# Patient Record
Sex: FEMALE | Race: OTHER | HISPANIC OR LATINO | URBAN - METROPOLITAN AREA
[De-identification: names, ages, dates, MRNs, and addresses within clinical notes are randomized per-mention and may not be internally consistent; named-entity substitution may affect disease eponyms.]

---

## 2017-06-24 PROBLEM — Z00.00 ENCOUNTER FOR PREVENTIVE HEALTH EXAMINATION: Status: ACTIVE | Noted: 2017-06-24

## 2021-04-08 ENCOUNTER — EMERGENCY (EMERGENCY)
Facility: HOSPITAL | Age: 46
LOS: 0 days | Discharge: HOME | End: 2021-04-08
Attending: EMERGENCY MEDICINE | Admitting: EMERGENCY MEDICINE
Payer: COMMERCIAL

## 2021-04-08 VITALS
RESPIRATION RATE: 16 BRPM | OXYGEN SATURATION: 100 % | DIASTOLIC BLOOD PRESSURE: 57 MMHG | TEMPERATURE: 98 F | WEIGHT: 171.96 LBS | HEART RATE: 63 BPM | SYSTOLIC BLOOD PRESSURE: 113 MMHG

## 2021-04-08 VITALS
OXYGEN SATURATION: 96 % | HEART RATE: 50 BPM | TEMPERATURE: 99 F | DIASTOLIC BLOOD PRESSURE: 53 MMHG | RESPIRATION RATE: 16 BRPM | SYSTOLIC BLOOD PRESSURE: 100 MMHG

## 2021-04-08 DIAGNOSIS — R42 DIZZINESS AND GIDDINESS: ICD-10-CM

## 2021-04-08 DIAGNOSIS — R00.1 BRADYCARDIA, UNSPECIFIED: ICD-10-CM

## 2021-04-08 DIAGNOSIS — U07.1 COVID-19: ICD-10-CM

## 2021-04-08 DIAGNOSIS — R05 COUGH: ICD-10-CM

## 2021-04-08 DIAGNOSIS — R51.9 HEADACHE, UNSPECIFIED: ICD-10-CM

## 2021-04-08 PROCEDURE — 93010 ELECTROCARDIOGRAM REPORT: CPT

## 2021-04-08 PROCEDURE — 99284 EMERGENCY DEPT VISIT MOD MDM: CPT

## 2021-04-08 RX ORDER — KETOROLAC TROMETHAMINE 30 MG/ML
30 SYRINGE (ML) INJECTION ONCE
Refills: 0 | Status: DISCONTINUED | OUTPATIENT
Start: 2021-04-08 | End: 2021-04-08

## 2021-04-08 RX ORDER — ACETAMINOPHEN 500 MG
650 TABLET ORAL ONCE
Refills: 0 | Status: COMPLETED | OUTPATIENT
Start: 2021-04-08 | End: 2021-04-08

## 2021-04-08 RX ORDER — METOCLOPRAMIDE HCL 10 MG
10 TABLET ORAL ONCE
Refills: 0 | Status: COMPLETED | OUTPATIENT
Start: 2021-04-08 | End: 2021-04-08

## 2021-04-08 RX ORDER — IBUPROFEN 200 MG
600 TABLET ORAL ONCE
Refills: 0 | Status: COMPLETED | OUTPATIENT
Start: 2021-04-08 | End: 2021-04-08

## 2021-04-08 RX ADMIN — Medication 650 MILLIGRAM(S): at 15:29

## 2021-04-08 RX ADMIN — Medication 600 MILLIGRAM(S): at 13:56

## 2021-04-08 RX ADMIN — Medication 10 MILLIGRAM(S): at 13:57

## 2021-04-08 NOTE — ED PROVIDER NOTE - OBJECTIVE STATEMENT
46 yo F with no pmhx presenting for evaluation of COVID symptoms. Patient started having a cough on 3/24 and was tested + for COVID on 3/25. Patient states although her quarantine is over she is still concerned because she is still having symptoms. Patient states she still has cough but started having headache and dizziness. Symptoms are mild. No relief with tylenol. Has not taken anything today. No cp, sob, fever, chills, neck pain, abdominal pain, nausea, vomiting, diarrhea, back pain, urinary symptoms, paresthesias, or weakness.

## 2021-04-08 NOTE — ED PROVIDER NOTE - CLINICAL SUMMARY MEDICAL DECISION MAKING FREE TEXT BOX
HA resolved, pt feels better, dc home supportive care, f/u pmd 1-2 weeks, strict return precautions provided

## 2021-04-08 NOTE — ED PROVIDER NOTE - ATTENDING CONTRIBUTION TO CARE
45F no pmh, covid 2 weeks ago, presents w persistent HA and dry cough. no fever. frontal pressure constant nonradiating worse on L sided. no numbness, weakness, tingling. no blurry vision, slurred speech, trouble walking. no trauma. no blood thinners. no cp, sob. no abd pain, nvdc. taking tylenol at home.     on exam, AFVSS, well cr nad, ncat, eomi, perrla, mmm, lctab, rrr nl s1s2 no mrg, abd soft ntnd, aaox3, CN 2-12 intact, No nystagmus.  5/5 motor x 4 ext, SILT x 4 extremities, No facial droop or slurred speech. No pronator drift.  Normal rapid alternating movement and finger nose finger bilaterally. No midline C/T/L tenderness to palpation or step off. Normal gait, No ataxia. no le edema or calf ttp,     100% RA sat     a/p; HA, NV intact. no red flags. will give reglan motrin and tylenol and re-eval

## 2021-04-08 NOTE — ED PROVIDER NOTE - PATIENT PORTAL LINK FT
You can access the FollowMyHealth Patient Portal offered by [facility] by registering at the following website: [email or web address]. By joining [facility]’s FollowMyHealth portal, you will also be able to view your health information using other applications (apps) compatible with our system.

## 2021-04-08 NOTE — ED PROVIDER NOTE - NS ED ROS FT
Review of Systems:  	•	CONSTITUTIONAL - no fever, no diaphoresis, no chills  	•	SKIN - no rash  	•	HEMATOLOGIC - no bleeding, no bruising  	•	EYES - no eye pain, no blurry vision  	•	ENT - no congestion  	•	RESPIRATORY - no shortness of breath, + cough  	•	CARDIAC - no chest pain, no palpitations  	•	GI - no abd pain, no nausea, no vomiting, no diarrhea, no constipation  	•	GENITO-URINARY - no dysuria; no hematuria, no increased urinary frequency  	•	MUSCULOSKELETAL - no joint paint, no swelling, no redness  	•	NEUROLOGIC - no weakness, +dizziness, + headache, no paresthesias, no LOC  	•	PSYCH - no anxiety, no depression  	All other ROS are negative except as documented in HPI.